# Patient Record
Sex: FEMALE | Race: WHITE | HISPANIC OR LATINO | Employment: OTHER | URBAN - METROPOLITAN AREA
[De-identification: names, ages, dates, MRNs, and addresses within clinical notes are randomized per-mention and may not be internally consistent; named-entity substitution may affect disease eponyms.]

---

## 2017-01-04 ENCOUNTER — TELEPHONE (OUTPATIENT)
Dept: CARDIOLOGY | Facility: CLINIC | Age: 67
End: 2017-01-04

## 2017-01-04 NOTE — TELEPHONE ENCOUNTER
PATIENT CALLED STATING SHE IS HAVING CHEST PAINS ON LEFT SIDE RUNNING DOWN ARM AND UP TO FACE CAUSING TINGLING SENSATION TO FACE , I ADVISED HER TO GO TO ER ASAP DUE TO SYMPTOMS OF HEART ATTACK AND OR STROKE. PATIENT WAS HESITANT BUT FINALLY AGRRED TO GO